# Patient Record
Sex: FEMALE | Race: OTHER | NOT HISPANIC OR LATINO | Employment: FULL TIME | URBAN - NONMETROPOLITAN AREA
[De-identification: names, ages, dates, MRNs, and addresses within clinical notes are randomized per-mention and may not be internally consistent; named-entity substitution may affect disease eponyms.]

---

## 2022-03-07 ENCOUNTER — OFFICE VISIT (OUTPATIENT)
Dept: ENDOCRINOLOGY | Facility: CLINIC | Age: 32
End: 2022-03-07

## 2022-03-07 VITALS
DIASTOLIC BLOOD PRESSURE: 88 MMHG | WEIGHT: 237.3 LBS | BODY MASS INDEX: 46.59 KG/M2 | SYSTOLIC BLOOD PRESSURE: 154 MMHG | OXYGEN SATURATION: 97 % | HEART RATE: 128 BPM | HEIGHT: 60 IN

## 2022-03-07 DIAGNOSIS — E66.01 CLASS 3 SEVERE OBESITY WITH BODY MASS INDEX (BMI) OF 45.0 TO 49.9 IN ADULT, UNSPECIFIED OBESITY TYPE, UNSPECIFIED WHETHER SERIOUS COMORBIDITY PRESENT: ICD-10-CM

## 2022-03-07 DIAGNOSIS — Z79.4 TYPE 2 DIABETES MELLITUS WITH HYPERGLYCEMIA, WITH LONG-TERM CURRENT USE OF INSULIN: ICD-10-CM

## 2022-03-07 DIAGNOSIS — E03.9 ACQUIRED HYPOTHYROIDISM: Primary | ICD-10-CM

## 2022-03-07 DIAGNOSIS — E11.65 TYPE 2 DIABETES MELLITUS WITH HYPERGLYCEMIA, WITH LONG-TERM CURRENT USE OF INSULIN: ICD-10-CM

## 2022-03-07 PROCEDURE — 99204 OFFICE O/P NEW MOD 45 MIN: CPT | Performed by: NURSE PRACTITIONER

## 2022-03-07 PROCEDURE — 95250 CONT GLUC MNTR PHYS/QHP EQP: CPT | Performed by: NURSE PRACTITIONER

## 2022-03-07 RX ORDER — GLUCAGON INJECTION, SOLUTION 1 MG/.2ML
1 INJECTION, SOLUTION SUBCUTANEOUS AS NEEDED
Qty: 0.4 ML | Refills: 11 | Status: SHIPPED | OUTPATIENT
Start: 2022-03-07 | End: 2022-03-08

## 2022-03-07 RX ORDER — LISINOPRIL 2.5 MG/1
2.5 TABLET ORAL DAILY
COMMUNITY

## 2022-03-07 RX ORDER — INSULIN GLARGINE 100 [IU]/ML
20 INJECTION, SOLUTION SUBCUTANEOUS DAILY
COMMUNITY

## 2022-03-07 RX ORDER — BUSPIRONE HYDROCHLORIDE 15 MG/1
15 TABLET ORAL 3 TIMES DAILY
COMMUNITY

## 2022-03-07 RX ORDER — SEMAGLUTIDE 1.34 MG/ML
0.25 INJECTION, SOLUTION SUBCUTANEOUS WEEKLY
COMMUNITY
End: 2022-03-07

## 2022-03-07 RX ORDER — FLUCONAZOLE 150 MG/1
150 TABLET ORAL ONCE
COMMUNITY

## 2022-03-07 RX ORDER — SEMAGLUTIDE 1.34 MG/ML
1 INJECTION, SOLUTION SUBCUTANEOUS WEEKLY
Qty: 27 ML | Refills: 3 | Status: SHIPPED | OUTPATIENT
Start: 2022-03-07

## 2022-03-07 RX ORDER — HYDROCHLOROTHIAZIDE 25 MG/1
25 TABLET ORAL DAILY
COMMUNITY

## 2022-03-07 RX ORDER — FERROUS SULFATE 325(65) MG
325 TABLET ORAL
COMMUNITY

## 2022-03-07 RX ORDER — MEDROXYPROGESTERONE ACETATE 150 MG/ML
150 INJECTION, SUSPENSION INTRAMUSCULAR
COMMUNITY

## 2022-03-07 RX ORDER — LABETALOL 100 MG/1
100 TABLET, FILM COATED ORAL 2 TIMES DAILY
COMMUNITY

## 2022-03-07 RX ORDER — LEVOTHYROXINE SODIUM 0.05 MG/1
50 TABLET ORAL DAILY
COMMUNITY

## 2022-03-07 NOTE — PROGRESS NOTES
"Chief Complaint  Diabetes    Subjective          Melony Lopez presents to [facility]  History of Present Illness       Referring provider Sarah Robbins DO     Chief Complaint   Patient presents with   • Diabetes       HPI      31 year old female presents for consultation     Reason -- diabetes mellitus type 2     Duration--diagnosed around 4 to 5 years ago    Context --she was sick with the flu, dehydrated and labs revealed sugar greater than 500     Timing constant     Quality  Not controlled     Severity none     Macrovascular complications---  none    Microvascular complications --- no neuropathy , no DR , no CKD     Current diabetes regimen     glp-1, insulin , oral medications         Current glucose monitoring     fingersticks    4 times daily       200 and higher       Gestational diabetes -- June 2021       Review of Systems - General ROS: negative                  Objective   Vital Signs:   /88   Pulse (!) 128   Ht 152.4 cm (60\")   Wt 108 kg (237 lb 4.8 oz)   SpO2 97%   BMI 46.34 kg/m²     Physical Exam  Constitutional:       Appearance: Normal appearance.   Cardiovascular:      Rate and Rhythm: Regular rhythm.      Heart sounds: Normal heart sounds.   Pulmonary:      Breath sounds: Normal breath sounds.   Musculoskeletal:         General: Normal range of motion.      Cervical back: Normal range of motion.   Neurological:      Mental Status: She is alert.        Result Review :   The following data was reviewed by: ARELI Hennessy on 03/07/2022:                Assessment and Plan    Diagnoses and all orders for this visit:    1. Acquired hypothyroidism (Primary)    2. Type 2 diabetes mellitus with hyperglycemia, with long-term current use of insulin (HCC)    3. Class 3 severe obesity with body mass index (BMI) of 45.0 to 49.9 in adult, unspecified obesity type, unspecified whether serious comorbidity present (HCC)    Other orders  -     " Glucagon (Gvoke PFS) 1 MG/0.2ML solution prefilled syringe; Inject 1 mg under the skin into the appropriate area as directed As Needed (hypoglycemia) for up to 1 day.  Dispense: 0.4 mL; Refill: 11  -     Semaglutide, 1 MG/DOSE, (Ozempic, 1 MG/DOSE,) 4 MG/3ML solution pen-injector; Inject 1 mg under the skin into the appropriate area as directed 1 (One) Time Per Week.  Dispense: 27 mL; Refill: 3  -     Continuous Blood Gluc Sensor (FreeStyle Jesi 2 Sensor) misc; 1 each Every 14 (Fourteen) Days. Use every 14 days  Dispense: 2 each; Refill: 11                       Glycemic Management:    Diabetes mellitus type 2     Last HgbA1c 13.3%           Taking Lantus or Basaglar 10 units ---increase 15 units once night     Taking Humalog     Give 4 units for medium carb meal    Give 8 units for large carb meal     +     Sliding scale     2 per 50 above 150           Taking Ozempic 0.5 mg once weekly --- increase to 1 mg weekly     Next appt jardiance 10 mg             Goals for sugar    Fasting and before meals 80 to 130    2 hours after meals 180 or less      Aim for 45 grams of carbohydrate per meal    Aim for 15 grams of carbohydrate per snack       Call in GVOKE         Approve jesi 2     We inserted the first jesi 2 in office, demonstrated how to insert, change device every 14 days, what the arrow up and down mean, how to adjust insulin based on data    Sent in RX for sensors             Microvascular Complications Monitoring       Last eye exam--- March 3, 2022 , no DR     Neuropathy none       Lipid Management:     Not on statin       Blood Pressure Management:      Taking lisinopril 2.5 mg daily       Taking HCTZ 25 mg daily         Thyroid Health      Hypothyroidism     Taking levothyroxine 50 mcg daily         Bone Health       No results found for: CALCIUM, PHOS        Weight Management:    Obesity     Patient's Body mass index is 46.34 kg/m². indicating that she is morbidly obese (BMI > 40 or > 35 with obesity -  related health condition). Obesity-related health conditions include the following: diabetes mellitus. Obesity is unchanged. BMI is is above average; no BMI management plan is appropriate. We discussed portion control and increasing exercise..        Preventive Care:       Non smoker       Records from  received and reviewed from 2022   Thank you for this consultation       Follow Up   Return in about 6 weeks (around 4/15/2022) for Recheck.  Patient was given instructions and counseling regarding her condition or for health maintenance advice. Please see specific information pulled into the AVS if appropriate.         This document has been electronically signed by ARELI Hennessy on March 7, 2022 16:34 CST.

## 2022-03-07 NOTE — PATIENT INSTRUCTIONS
Taking Lantus or Basaglar 10 units ---increase 15 units once night     Taking Humalog     Give 4 units for medium carb meal    Give 8 units for large carb meal     +     Sliding scale     2 per 50 above 150           Taking Ozempic 0.5 mg once weekly --- increase to 1 mg weekly     Next appt jardiance 10 mg             Goals for sugar    Fasting and before meals 80 to 130    2 hours after meals 180 or less      Aim for 45 grams of carbohydrate per meal    Aim for 15 grams of carbohydrate per snack       Call in GVOKE

## 2022-04-15 ENCOUNTER — TELEMEDICINE (OUTPATIENT)
Dept: ENDOCRINOLOGY | Facility: CLINIC | Age: 32
End: 2022-04-15

## 2022-04-15 DIAGNOSIS — Z79.4 TYPE 2 DIABETES MELLITUS WITH HYPERGLYCEMIA, WITH LONG-TERM CURRENT USE OF INSULIN: Primary | ICD-10-CM

## 2022-04-15 DIAGNOSIS — E11.65 TYPE 2 DIABETES MELLITUS WITH HYPERGLYCEMIA, WITH LONG-TERM CURRENT USE OF INSULIN: Primary | ICD-10-CM

## 2022-04-15 DIAGNOSIS — I10 PRIMARY HYPERTENSION: ICD-10-CM

## 2022-04-15 DIAGNOSIS — E03.9 ACQUIRED HYPOTHYROIDISM: ICD-10-CM

## 2022-04-15 PROCEDURE — 99214 OFFICE O/P EST MOD 30 MIN: CPT | Performed by: NURSE PRACTITIONER

## 2022-04-15 NOTE — PROGRESS NOTES
Chief Complaint  Diabetes    Subjective          Melony Lopez presents to [facility]  History of Present Illness     You have chosen to receive care through a telehealth visit.  Do you consent to use a video/audio connection for your medical care today? Yes            TELEHEALTH VIDEO VISIT     This a video visit due to [facility] current guidelines for social distancing due to the COVID 19 pandemic          31 year old female presents for follow up       Reason -- diabetes mellitus type 2      Duration--diagnosed around 4 to 5 years ago     Context --she was sick with the flu, dehydrated and labs revealed sugar greater than 500      Timing constant      Quality  Not controlled      Severity none      Microvascular complications --- no neuropathy , no DR , no CKD      Current diabetes regimen      glp-1, insulin , oral medications            Current glucose monitoring      fingersticks     4 times daily       using the kevin     States improved but still higher then goal     No lows         Gestational diabetes -- June 2021       Review of Systems - General ROS: negative                Objective   Vital Signs:   There were no vitals taken for this visit.    Physical Exam  Neurological:      General: No focal deficit present.      Mental Status: She is alert.   Psychiatric:         Mood and Affect: Mood normal.         Thought Content: Thought content normal.         Judgment: Judgment normal.        Result Review :   The following data was reviewed by: ARELI Hennessy on 04/15/2022:                Assessment and Plan    Diagnoses and all orders for this visit:    1. Type 2 diabetes mellitus with hyperglycemia, with long-term current use of insulin ([facility]) (Primary)  -     CBC & Differential; Future  -     Comprehensive Metabolic Panel; Future  -     Hemoglobin A1c; Future  -     Microalbumin / Creatinine Urine Ratio - Urine, Clean Catch; Future  -     TSH; Future  -      Vitamin D 25 Hydroxy; Future  -     Lipid Panel; Future    2. Acquired hypothyroidism  -     CBC & Differential; Future  -     Comprehensive Metabolic Panel; Future  -     Hemoglobin A1c; Future  -     Microalbumin / Creatinine Urine Ratio - Urine, Clean Catch; Future  -     TSH; Future  -     Vitamin D 25 Hydroxy; Future  -     Lipid Panel; Future    3. Primary hypertension             Glycemic Management:     Diabetes mellitus type 2      Last HgbA1c 13.3%               Taking Lantus or Basaglar 10 units  She did not increase the dosage --increase to 12 units          Taking Humalog      Give 4 units for medium carb meal     Give 8 units for large carb meal      +      Sliding scale      2 per 50 above 150       use the kevin to guide dosage    If 2 hour post meal is higher then 180 you are not giving enough mealtime insulin    Next time you eat the same meal either eat less or you have to give more insulin at least by 2 units         Taking Ozempic 1 mg weekly      Next appt jardiance 10 mg                  Goals for sugar     Fasting and before meals 80 to 130     2 hours after meals 180 or less        Aim for 45 grams of carbohydrate per meal     Aim for 15 grams of carbohydrate per snack         Call in GVOKE            Using kevin 2 --could not download                         Microvascular Complications Monitoring         Last eye exam--- March 3, 2022 , no DR      Neuropathy none         Lipid Management:      Not on statin         Blood Pressure Management:        Taking lisinopril 2.5 mg daily         Taking HCTZ 25 mg daily            Thyroid Health        Hypothyroidism      Taking levothyroxine 50 mcg daily            Bone Health         No results found for: CALCIUM, PHOS           Weight Management:     Obesity                   Preventive Care:         Non smoker              Follow Up   No follow-ups on file.  Patient was given instructions and counseling regarding her condition or for health  maintenance advice. Please see specific information pulled into the AVS if appropriate.         This document has been electronically signed by ARELI Hennessy on April 15, 2022 13:19 CDT.